# Patient Record
Sex: FEMALE | Race: WHITE | NOT HISPANIC OR LATINO | Employment: FULL TIME | URBAN - METROPOLITAN AREA
[De-identification: names, ages, dates, MRNs, and addresses within clinical notes are randomized per-mention and may not be internally consistent; named-entity substitution may affect disease eponyms.]

---

## 2023-09-06 PROBLEM — R10.9 ABDOMINAL PAIN: Status: ACTIVE | Noted: 2023-09-06

## 2023-09-06 PROBLEM — R06.02 SHORTNESS OF BREATH: Status: ACTIVE | Noted: 2023-09-06

## 2023-09-06 PROBLEM — R07.9 CHEST PAIN: Status: ACTIVE | Noted: 2023-09-06

## 2023-09-06 PROBLEM — R10.13 ABDOMINAL PAIN, ACUTE, EPIGASTRIC: Status: ACTIVE | Noted: 2023-09-06

## 2023-09-06 PROBLEM — R60.0 BILATERAL EDEMA OF LOWER EXTREMITY: Status: ACTIVE | Noted: 2023-09-06

## 2023-09-06 PROBLEM — E04.1 THYROID NODULE: Status: ACTIVE | Noted: 2023-09-06

## 2023-09-06 PROBLEM — I35.1 MODERATE AORTIC REGURGITATION: Status: ACTIVE | Noted: 2023-09-06

## 2023-09-06 PROBLEM — F32.0 MAJOR DEPRESSIVE DISORDER, SINGLE EPISODE, MILD (CMS-HCC): Status: ACTIVE | Noted: 2023-09-06

## 2023-09-06 PROBLEM — H91.90 SUBJECTIVE HEARING LOSS: Status: ACTIVE | Noted: 2023-09-06

## 2023-09-06 PROBLEM — K29.80 DUODENITIS: Status: ACTIVE | Noted: 2023-09-06

## 2023-09-06 PROBLEM — U07.1 COVID-19 VIRUS INFECTION: Status: ACTIVE | Noted: 2023-09-06

## 2023-09-06 PROBLEM — K82.4 GALLBLADDER POLYP: Status: ACTIVE | Noted: 2023-09-06

## 2023-09-06 PROBLEM — H93.231 HYPERACUSIS OF RIGHT EAR: Status: ACTIVE | Noted: 2023-09-06

## 2023-09-06 PROBLEM — S06.0XAA CONCUSSION: Status: ACTIVE | Noted: 2023-09-06

## 2023-09-06 PROBLEM — R05.9 COUGH: Status: ACTIVE | Noted: 2023-09-06

## 2023-09-06 PROBLEM — N28.9 KIDNEY LESION, NATIVE, LEFT: Status: ACTIVE | Noted: 2023-09-06

## 2023-09-06 PROBLEM — D13.5 ADENOMYOMATOSIS OF GALLBLADDER: Status: ACTIVE | Noted: 2023-09-06

## 2023-09-06 PROBLEM — H93.11 RIGHT-SIDED TINNITUS: Status: ACTIVE | Noted: 2023-09-06

## 2023-09-06 PROBLEM — K82.8 BILIARY DYSKINESIA: Status: ACTIVE | Noted: 2023-09-06

## 2023-09-06 PROBLEM — R00.2 PALPITATIONS: Status: ACTIVE | Noted: 2023-09-06

## 2023-09-06 PROBLEM — E04.9 ENLARGED THYROID: Status: ACTIVE | Noted: 2023-09-06

## 2023-09-06 PROBLEM — R10.11 ABDOMINAL PAIN, RUQ (RIGHT UPPER QUADRANT): Status: ACTIVE | Noted: 2023-09-06

## 2023-09-06 RX ORDER — LEVONORGESTREL 52 MG/1
INTRAUTERINE DEVICE INTRAUTERINE
COMMUNITY

## 2023-09-06 RX ORDER — ALBUTEROL SULFATE 90 UG/1
AEROSOL, METERED RESPIRATORY (INHALATION)
COMMUNITY
Start: 2021-05-07 | End: 2023-09-26 | Stop reason: ALTCHOICE

## 2023-09-26 ENCOUNTER — OFFICE VISIT (OUTPATIENT)
Dept: PRIMARY CARE | Facility: CLINIC | Age: 43
End: 2023-09-26
Payer: COMMERCIAL

## 2023-09-26 VITALS
HEART RATE: 90 BPM | BODY MASS INDEX: 27.94 KG/M2 | TEMPERATURE: 96.7 F | HEIGHT: 67 IN | SYSTOLIC BLOOD PRESSURE: 116 MMHG | DIASTOLIC BLOOD PRESSURE: 70 MMHG | WEIGHT: 178 LBS | OXYGEN SATURATION: 98 %

## 2023-09-26 DIAGNOSIS — M54.14 THORACIC RADICULITIS: Primary | ICD-10-CM

## 2023-09-26 DIAGNOSIS — I35.1 MODERATE AORTIC REGURGITATION: ICD-10-CM

## 2023-09-26 DIAGNOSIS — E04.9 ENLARGED THYROID: ICD-10-CM

## 2023-09-26 DIAGNOSIS — Z00.00 HEALTHCARE MAINTENANCE: ICD-10-CM

## 2023-09-26 DIAGNOSIS — E04.1 THYROID NODULE: ICD-10-CM

## 2023-09-26 DIAGNOSIS — R73.9 HYPERGLYCEMIA: ICD-10-CM

## 2023-09-26 DIAGNOSIS — Z12.31 BREAST CANCER SCREENING BY MAMMOGRAM: ICD-10-CM

## 2023-09-26 DIAGNOSIS — E55.9 VITAMIN D DEFICIENCY: ICD-10-CM

## 2023-09-26 PROBLEM — M71.371 OTHER BURSAL CYST, RIGHT ANKLE AND FOOT: Status: ACTIVE | Noted: 2017-04-19

## 2023-09-26 PROBLEM — D22.71: Status: ACTIVE | Noted: 2017-04-19

## 2023-09-26 PROBLEM — D17.23 LIPOMA OF RIGHT LOWER EXTREMITY: Status: ACTIVE | Noted: 2017-04-19

## 2023-09-26 PROBLEM — G89.29 CHRONIC PAIN OF RIGHT ANKLE: Status: ACTIVE | Noted: 2017-04-19

## 2023-09-26 PROBLEM — M25.571 CHRONIC PAIN OF RIGHT ANKLE: Status: ACTIVE | Noted: 2017-04-19

## 2023-09-26 PROCEDURE — 99396 PREV VISIT EST AGE 40-64: CPT | Performed by: INTERNAL MEDICINE

## 2023-09-26 PROCEDURE — 1036F TOBACCO NON-USER: CPT | Performed by: INTERNAL MEDICINE

## 2023-09-26 ASSESSMENT — ENCOUNTER SYMPTOMS
VOMITING: 0
JOINT SWELLING: 0
GASTROINTESTINAL NEGATIVE: 1
HEADACHES: 0
APPETITE CHANGE: 0
POLYPHAGIA: 0
POLYDIPSIA: 0
SPEECH DIFFICULTY: 0
HALLUCINATIONS: 0
WHEEZING: 0
NECK STIFFNESS: 0
SINUS PRESSURE: 0
DIFFICULTY URINATING: 0
COUGH: 0
CHEST TIGHTNESS: 0
ACTIVITY CHANGE: 0
PALPITATIONS: 0
NUMBNESS: 0
WOUND: 0
TREMORS: 0
COLOR CHANGE: 0
ABDOMINAL PAIN: 0
DIZZINESS: 0
EYES NEGATIVE: 1
SHORTNESS OF BREATH: 0
SORE THROAT: 0
CONSTITUTIONAL NEGATIVE: 1
EYE REDNESS: 0
RESPIRATORY NEGATIVE: 1
MYALGIAS: 0
NAUSEA: 0
FREQUENCY: 0
NERVOUS/ANXIOUS: 0
CONFUSION: 0
EYE PAIN: 0
FACIAL ASYMMETRY: 0
SEIZURES: 0
AGITATION: 0
CONSTIPATION: 0
NECK PAIN: 0
PSYCHIATRIC NEGATIVE: 1
ADENOPATHY: 0
STRIDOR: 0
VOICE CHANGE: 0
BACK PAIN: 0
BLOOD IN STOOL: 0
FLANK PAIN: 0
DIARRHEA: 0
SINUS PAIN: 0
EYE DISCHARGE: 0
UNEXPECTED WEIGHT CHANGE: 0
ARTHRALGIAS: 0
ALLERGIC/IMMUNOLOGIC NEGATIVE: 1
TROUBLE SWALLOWING: 0
BRUISES/BLEEDS EASILY: 0
SLEEP DISTURBANCE: 0
ENDOCRINE NEGATIVE: 1
DYSURIA: 0
WEAKNESS: 0
NEUROLOGICAL NEGATIVE: 1
HEMATOLOGIC/LYMPHATIC NEGATIVE: 1
LIGHT-HEADEDNESS: 0
MUSCULOSKELETAL NEGATIVE: 1

## 2023-09-26 ASSESSMENT — PATIENT HEALTH QUESTIONNAIRE - PHQ9
1. LITTLE INTEREST OR PLEASURE IN DOING THINGS: NOT AT ALL
2. FEELING DOWN, DEPRESSED OR HOPELESS: NOT AT ALL
SUM OF ALL RESPONSES TO PHQ9 QUESTIONS 1 AND 2: 0

## 2023-09-26 NOTE — PROGRESS NOTES
Subjective   Patient ID: Lisbeth Dunlap is a 42 y.o. female who presents for Annual Exam (She had an EKG with Dr. Navarro).  She is here for her annual physical, she did have EKG with Dr. Navarro this year     HPI    Patient has been feeling pretty good and has been complaint with prescribed medications.  C/o left sided chest discomfort on and off, worse with certain activities. Cardiac work up was negative.  Symptoms could be related to thoracic radiculopathy.  She has some concerns about occasional difficulties remembering words, w discussed possible referral to neurology, patient wants to wait and will let me know when ready.   We reviewed and discussed details of recent blood work: CBC, CMP, TSH, Lipid panel done in 2022.  Results within acceptable range. Borderline elevated glucose noted.     Last mammogram: 2021  PAP: per GYN        Review of Systems   Constitutional: Negative.  Negative for activity change, appetite change and unexpected weight change.   HENT: Negative.  Negative for congestion, ear discharge, ear pain, hearing loss, mouth sores, nosebleeds, sinus pressure, sinus pain, sore throat, trouble swallowing and voice change.    Eyes: Negative.  Negative for pain, discharge, redness and visual disturbance.   Respiratory: Negative.  Negative for cough, chest tightness, shortness of breath, wheezing and stridor.    Cardiovascular:  Positive for chest pain. Negative for palpitations and leg swelling.   Gastrointestinal: Negative.  Negative for abdominal pain, blood in stool, constipation, diarrhea, nausea and vomiting.   Endocrine: Negative.  Negative for polydipsia, polyphagia and polyuria.   Genitourinary: Negative.  Negative for difficulty urinating, dysuria, flank pain, frequency and urgency.   Musculoskeletal: Negative.  Negative for arthralgias, back pain, gait problem, joint swelling, myalgias, neck pain and neck stiffness.   Skin: Negative.  Negative for color change, rash and wound.  "  Allergic/Immunologic: Negative.  Negative for environmental allergies, food allergies and immunocompromised state.   Neurological: Negative.  Negative for dizziness, tremors, seizures, syncope, facial asymmetry, speech difficulty, weakness, light-headedness, numbness and headaches.   Hematological: Negative.  Negative for adenopathy. Does not bruise/bleed easily.   Psychiatric/Behavioral: Negative.  Negative for agitation, behavioral problems, confusion, hallucinations, sleep disturbance and suicidal ideas. The patient is not nervous/anxious.    All other systems reviewed and are negative.      Objective     Review of systems was performed and all systems were negative except what in HPI    /70 (BP Location: Left arm, Patient Position: Sitting)   Pulse 90   Temp 35.9 °C (96.7 °F) (Temporal)   Ht 1.702 m (5' 7\")   Wt 80.7 kg (178 lb)   SpO2 98%   BMI 27.88 kg/m²    Physical Exam  Vitals and nursing note reviewed.   Constitutional:       General: She is not in acute distress.     Appearance: Normal appearance.   HENT:      Head: Normocephalic and atraumatic.      Right Ear: External ear normal.      Left Ear: External ear normal.      Nose: Nose normal. No congestion or rhinorrhea.   Eyes:      General:         Right eye: No discharge.         Left eye: No discharge.      Extraocular Movements: Extraocular movements intact.      Conjunctiva/sclera: Conjunctivae normal.      Pupils: Pupils are equal, round, and reactive to light.   Neck:      Comments: Mildly enlarged thyroid gland noted.  Cardiovascular:      Rate and Rhythm: Normal rate and regular rhythm.      Pulses: Normal pulses.      Heart sounds: Murmur heard.      Systolic murmur is present with a grade of 2/6.      No friction rub. No gallop.   Pulmonary:      Effort: Pulmonary effort is normal. No respiratory distress.      Breath sounds: Normal breath sounds. No stridor. No wheezing, rhonchi or rales.   Chest:      Chest wall: No tenderness. "   Abdominal:      General: Bowel sounds are normal.      Palpations: Abdomen is soft. There is no mass.      Tenderness: There is no abdominal tenderness. There is no guarding or rebound.   Musculoskeletal:         General: No swelling or deformity. Normal range of motion.      Cervical back: Normal range of motion and neck supple. No rigidity or tenderness.      Right lower leg: No edema.      Left lower leg: No edema.   Lymphadenopathy:      Cervical: No cervical adenopathy.   Skin:     General: Skin is warm and dry.      Coloration: Skin is not jaundiced.      Findings: No bruising or erythema.   Neurological:      General: No focal deficit present.      Mental Status: She is alert and oriented to person, place, and time. Mental status is at baseline.      Cranial Nerves: No cranial nerve deficit.      Motor: No weakness.      Coordination: Coordination normal.      Gait: Gait normal.   Psychiatric:         Mood and Affect: Mood normal.         Behavior: Behavior normal.         Thought Content: Thought content normal.         Judgment: Judgment normal.         Assessment/Plan   Problem List Items Addressed This Visit             ICD-10-CM       Cardiac and Vasculature    Moderate aortic regurgitation I35.1     Clinically stable. F/up with cardiology(Dr. Aaron)            Endocrine/Metabolic    Enlarged thyroid E04.9    Relevant Orders    US thyroid    Thyroid nodule E04.1     S/p biopsy in 2019. Obtain US thyroid. Consult new endocrinologist.         Relevant Orders    Referral to Endocrinology     Other Visit Diagnoses         Codes    Thoracic radiculitis    -  Primary M54.14    Relevant Orders    XR thoracic spine complete 4+ views    Healthcare maintenance     Z00.00    Relevant Orders    CBC    Comprehensive Metabolic Panel    Lipid Panel    TSH with reflex to Free T4 if abnormal    Hyperglycemia     R73.9    Relevant Orders    Hemoglobin A1C    Vitamin D deficiency     E55.9    Relevant Orders    Vitamin  D 25-Hydroxy,Total (for eval of Vitamin D levels)    Breast cancer screening by mammogram     Z12.31    Relevant Orders    BI mammo bilateral screening tomosynthesis          It was a pleasure to see you today.  I would like to remind you about importance of a healthy lifestyle in order to improve your well-being and live longer.  Try to engage in physical activities for at least 150 minutes per week.  Eat about 10 servings of fruits and vegetables daily. My advice is 2 servings of fruits and 8 servings of vegetables.  For vegetables choose at least half of them green and at least half of them fresh.  Please avoid sugar, salt, fried food and saturated fat.    F/up in 1 year or sooner if needed.

## 2023-10-04 ENCOUNTER — APPOINTMENT (OUTPATIENT)
Dept: RADIOLOGY | Facility: CLINIC | Age: 43
End: 2023-10-04
Payer: COMMERCIAL

## 2023-10-07 ENCOUNTER — APPOINTMENT (OUTPATIENT)
Dept: RADIOLOGY | Facility: CLINIC | Age: 43
End: 2023-10-07
Payer: COMMERCIAL

## 2023-10-14 ENCOUNTER — LAB (OUTPATIENT)
Dept: LAB | Facility: LAB | Age: 43
End: 2023-10-14
Payer: COMMERCIAL

## 2023-10-14 DIAGNOSIS — E55.9 VITAMIN D DEFICIENCY: ICD-10-CM

## 2023-10-14 DIAGNOSIS — Z00.00 HEALTHCARE MAINTENANCE: ICD-10-CM

## 2023-10-14 DIAGNOSIS — R73.9 HYPERGLYCEMIA: ICD-10-CM

## 2023-10-14 LAB
25(OH)D3 SERPL-MCNC: 64 NG/ML (ref 30–100)
ALBUMIN SERPL BCP-MCNC: 4.5 G/DL (ref 3.4–5)
ALP SERPL-CCNC: 55 U/L (ref 33–110)
ALT SERPL W P-5'-P-CCNC: 14 U/L (ref 7–45)
ANION GAP SERPL CALC-SCNC: 9 MMOL/L (ref 10–20)
AST SERPL W P-5'-P-CCNC: 14 U/L (ref 9–39)
BILIRUB SERPL-MCNC: 0.6 MG/DL (ref 0–1.2)
BUN SERPL-MCNC: 12 MG/DL (ref 6–23)
CALCIUM SERPL-MCNC: 9.4 MG/DL (ref 8.6–10.3)
CHLORIDE SERPL-SCNC: 103 MMOL/L (ref 98–107)
CHOLEST SERPL-MCNC: 172 MG/DL (ref 0–199)
CHOLESTEROL/HDL RATIO: 3.2
CO2 SERPL-SCNC: 28 MMOL/L (ref 21–32)
CREAT SERPL-MCNC: 1.04 MG/DL (ref 0.5–1.05)
ERYTHROCYTE [DISTWIDTH] IN BLOOD BY AUTOMATED COUNT: 12.9 % (ref 11.5–14.5)
EST. AVERAGE GLUCOSE BLD GHB EST-MCNC: 103 MG/DL
GFR SERPL CREATININE-BSD FRML MDRD: 68 ML/MIN/1.73M*2
GLUCOSE SERPL-MCNC: 96 MG/DL (ref 74–99)
HBA1C MFR BLD: 5.2 %
HCT VFR BLD AUTO: 41 % (ref 36–46)
HDLC SERPL-MCNC: 54.5 MG/DL
HGB BLD-MCNC: 13.4 G/DL (ref 12–16)
LDLC SERPL CALC-MCNC: 105 MG/DL
MCH RBC QN AUTO: 30.7 PG (ref 26–34)
MCHC RBC AUTO-ENTMCNC: 32.7 G/DL (ref 32–36)
MCV RBC AUTO: 94 FL (ref 80–100)
NON HDL CHOLESTEROL: 118 MG/DL (ref 0–149)
NRBC BLD-RTO: 0 /100 WBCS (ref 0–0)
PLATELET # BLD AUTO: 272 X10*3/UL (ref 150–450)
PMV BLD AUTO: 11.1 FL (ref 7.5–11.5)
POTASSIUM SERPL-SCNC: 4.3 MMOL/L (ref 3.5–5.3)
PROT SERPL-MCNC: 6.9 G/DL (ref 6.4–8.2)
RBC # BLD AUTO: 4.36 X10*6/UL (ref 4–5.2)
SODIUM SERPL-SCNC: 136 MMOL/L (ref 136–145)
TRIGL SERPL-MCNC: 64 MG/DL (ref 0–149)
TSH SERPL-ACNC: 1.16 MIU/L (ref 0.44–3.98)
VLDL: 13 MG/DL (ref 0–40)
WBC # BLD AUTO: 6.5 X10*3/UL (ref 4.4–11.3)

## 2023-10-14 PROCEDURE — 83036 HEMOGLOBIN GLYCOSYLATED A1C: CPT

## 2023-10-14 PROCEDURE — 36415 COLL VENOUS BLD VENIPUNCTURE: CPT

## 2023-10-14 PROCEDURE — 85027 COMPLETE CBC AUTOMATED: CPT

## 2023-10-14 PROCEDURE — 80061 LIPID PANEL: CPT

## 2023-10-14 PROCEDURE — 82306 VITAMIN D 25 HYDROXY: CPT

## 2023-10-14 PROCEDURE — 80053 COMPREHEN METABOLIC PANEL: CPT

## 2023-10-14 PROCEDURE — 84443 ASSAY THYROID STIM HORMONE: CPT

## 2023-10-18 ENCOUNTER — APPOINTMENT (OUTPATIENT)
Dept: RADIOLOGY | Facility: CLINIC | Age: 43
End: 2023-10-18
Payer: COMMERCIAL

## 2023-10-19 ENCOUNTER — APPOINTMENT (OUTPATIENT)
Dept: RADIOLOGY | Facility: CLINIC | Age: 43
End: 2023-10-19
Payer: COMMERCIAL

## 2023-10-25 ENCOUNTER — ANCILLARY PROCEDURE (OUTPATIENT)
Dept: RADIOLOGY | Facility: CLINIC | Age: 43
End: 2023-10-25
Payer: COMMERCIAL

## 2023-10-25 DIAGNOSIS — E04.9 ENLARGED THYROID: ICD-10-CM

## 2023-10-25 DIAGNOSIS — Z12.31 BREAST CANCER SCREENING BY MAMMOGRAM: ICD-10-CM

## 2023-10-25 DIAGNOSIS — M54.14 THORACIC RADICULITIS: ICD-10-CM

## 2023-10-25 PROCEDURE — 77063 BREAST TOMOSYNTHESIS BI: CPT | Mod: BILATERAL PROCEDURE | Performed by: RADIOLOGY

## 2023-10-25 PROCEDURE — 76536 US EXAM OF HEAD AND NECK: CPT

## 2023-10-25 PROCEDURE — 77067 SCR MAMMO BI INCL CAD: CPT | Mod: BILATERAL PROCEDURE | Performed by: RADIOLOGY

## 2023-10-25 PROCEDURE — 72072 X-RAY EXAM THORAC SPINE 3VWS: CPT | Mod: FY

## 2023-10-25 PROCEDURE — 77067 SCR MAMMO BI INCL CAD: CPT | Mod: 50

## 2023-10-25 PROCEDURE — 76536 US EXAM OF HEAD AND NECK: CPT | Performed by: RADIOLOGY

## 2023-10-25 PROCEDURE — 72072 X-RAY EXAM THORAC SPINE 3VWS: CPT | Performed by: RADIOLOGY

## 2023-11-15 ENCOUNTER — APPOINTMENT (OUTPATIENT)
Dept: PRIMARY CARE | Facility: CLINIC | Age: 43
End: 2023-11-15
Payer: COMMERCIAL

## 2023-11-29 ENCOUNTER — OFFICE VISIT (OUTPATIENT)
Dept: DERMATOLOGY | Facility: CLINIC | Age: 43
End: 2023-11-29
Payer: COMMERCIAL

## 2023-11-29 DIAGNOSIS — Z12.83 SKIN CANCER SCREENING: Primary | ICD-10-CM

## 2023-11-29 DIAGNOSIS — L82.1 SEBORRHEIC KERATOSIS: ICD-10-CM

## 2023-11-29 DIAGNOSIS — D18.01 CHERRY ANGIOMA: ICD-10-CM

## 2023-11-29 DIAGNOSIS — L71.9 ROSACEA: ICD-10-CM

## 2023-11-29 DIAGNOSIS — D22.9 NEVUS: ICD-10-CM

## 2023-11-29 DIAGNOSIS — L81.4 LENTIGO: ICD-10-CM

## 2023-11-29 PROCEDURE — 1036F TOBACCO NON-USER: CPT | Performed by: NURSE PRACTITIONER

## 2023-11-29 PROCEDURE — 99204 OFFICE O/P NEW MOD 45 MIN: CPT | Performed by: NURSE PRACTITIONER

## 2023-11-29 RX ORDER — METRONIDAZOLE 7.5 MG/G
1 CREAM TOPICAL 2 TIMES DAILY
Qty: 45 G | Refills: 0 | Status: SHIPPED | OUTPATIENT
Start: 2023-11-29 | End: 2024-11-28

## 2023-11-29 RX ORDER — DOXYCYCLINE HYCLATE 100 MG
100 TABLET ORAL 2 TIMES DAILY
Qty: 120 TABLET | Refills: 0 | Status: SHIPPED | OUTPATIENT
Start: 2023-11-29 | End: 2024-01-28

## 2023-11-29 ASSESSMENT — ITCH NUMERIC RATING SCALE: HOW SEVERE IS YOUR ITCHING?: 0

## 2023-11-29 NOTE — PROGRESS NOTES
"Subjective     Lisbeth Dunlap is a 43 y.o. female who presents for the following: Rosacea and Skin Check.     New patient visit patient in for full body skin exam patient states she would also like to address rosacea she has had longstanding patient has tried \"some type of Metro gel \"but only uses this occasionally.  Patient states her rosacea is better today than when she first had made this appointment describes that she was flaring.    Review of Systems:  No other skin or systemic complaints other than what is documented elsewhere in the note.    The following portions of the chart were reviewed this encounter and updated as appropriate:       Skin Cancer History  No skin cancer on file.    Specialty Problems          Dermatology Problems    Nevus of toe of right foot     Past Medical History:  Lisbeth Dunlap  has a past medical history of Personal history of other diseases of the circulatory system (06/06/2019).    Past Surgical History:  Lisbeth Dunlap  has a past surgical history that includes Other surgical history (07/31/2019); Other surgical history (07/31/2019); and US guided thyroid biopsy (6/21/2019).    Family History:  Patient family history includes Breast cancer in her mother and another family member; CVA in her father; Hypertension in her father; Melanoma in her father.    Social History:  Lisbeth Dunlap  reports that she has never smoked. She has never used smokeless tobacco. She reports current alcohol use. She reports that she does not use drugs.    Allergies:  Bee venom protein (honey bee)    Current Medications / CAM's:    Current Outpatient Medications:     doxycycline (Vibra-Tabs) 100 mg tablet, Take 1 tablet (100 mg) by mouth 2 times a day. Take with a full glass of water and do not lie down for at least 30 minutes after., Disp: 120 tablet, Rfl: 0    levonorgestrel (Mirena) 21 mcg/24 hours (8 yrs) 52 mg IUD, by intrauterine route., Disp: , Rfl:     metroNIDAZOLE (Metrocream) 0.75 % " cream, Apply 1 Application topically 2 times a day., Disp: 45 g, Rfl: 0     Objective   Well appearing patient in no apparent distress; mood and affect are within normal limits.    A full examination was performed including scalp, head, eyes, ears, nose, lips, neck, chest, axillae, abdomen, back, buttocks, bilateral upper extremities, bilateral lower extremities, hands, feet, fingers, toes, fingernails, and toenails. All findings within normal limits unless otherwise noted below.    Assessment/Plan   1. Skin cancer screening    The patient presented for a routine skin examination today. There are no specific concerns regarding skin health and no new or changing moles, lesions, or rashes.     Assessment: Based on the comprehensive skin examination, there were no concerning or abnormal findings. The patient's skin appeared to be in good health, without any notable dermatologic conditions or lesions.    Plan: Given the absence of any significant skin findings, no specific interventions or treatments are warranted at this time. The patient was educated on the importance of regular skin self-examinations and advised to promptly report any changes or concerns. Routine follow-up for a skin examination was recommended.    -These lesions have benign, reassuring patterns on dermoscopy.  -There were no concerning features found on exam today.  -Recommend continued self-observation, and to contact the office if any changes in nevi are  noticed.    Discussed/information given on safe sun practices and use of sunscreen, sun protective clothing or sun avoidance. Recommend to use OTC medication of sunscreen SPF 30 or higher on a daily basis prior to sun exposure to reduce the risk of skin cancer.    Contact Office if: Any lesions change in size, shape or color; itch, bum or bleed.         2. Nevus  Multiple benign appearing flesh colored to pigmented macules and papules     Plan: Counseling.  I counseled the patient regarding the  "following:  Instructions: Monthly self-skin checks to monitor for any changes in moles are recommended. Expectations: Benign Nevi are pigmented nests of cells within the skin.No treatment is necessary. Contact Office if: Any moles change in size, shape or color; itch, bum or bleed.    3. Lentigo  Benign pigmented macules appearing in sun exposed areas     Solar lentigo (a type of lentigo also known as a senile lentigo, age spot, or liver spot) is a benign pigmented macule appearing on fair-skinned individuals that is related to ultraviolet radiation (UVR) exposure, typically from the sun.     PLAN:  Limiting sun exposure through avoidance, protective clothing, and use of sunscreens can help prevent the appearance of solar lentigines.    If lesion changes or becomes symptomatic she should return to clinic    4. Mcnamara angioma  Small (2-10 mm), bright red or violaceous macules or smooth, domed papules    PLAN:  Reassurance these lesions are benign  Treatment is only indicated in the case of irritation or hemorrhage    5. Seborrheic keratosis    Seborrheic keratoses (SKs) are extremely common benign neoplasms of the skin. There can be few or hundreds of these raised, \"stuck-on\"-appearing papules and plaques with well-defined borders. The cause is unknown, although there is a familial trait for the development of multiple SKs.      SKs tend to increase in incidence and number with increasing age.     Skin Care: Seborrheic Keratoses are benign. No treatment is necessary.    Patient was instructed to call the office if any lesions become irritated or inflamed        6. Rosacea  Dorsum of Nose, Left Alar Crease, Left Buccal Cheek, Left Malar Cheek, Left Nasal Sidewall, Left Parotid Area, Left Preauricular Area, Left Zygomatic Area, Right Buccal Cheek, Right Malar Cheek, Right Parotid Area  Mid face erythema with telangiectasias and scattered inflammatory papules.    Summary: You presented with symptoms consistent with " rosacea, including persistent redness, flushing, and the presence of papules and pustules on the central portion of your face. During our initial consultation, we discussed the possible triggers for your rosacea flare-ups, such as exposure to sunlight, hot and spicy foods, alcohol, and certain skincare products. We also addressed the importance of implementing a tailored skincare routine and adhering to prescribed treatments to manage your condition effectively.    PLAN:  1. Medication:  ° You were prescribed a topical antibiotic, such as metronidazole gel or azelaic acid, to help reduce inflammation and control the papules and pustules. It is crucial to apply the medication as directed and consistently to achieve optimal results.  ° In severe cases or if topical treatment proves inadequate, oral antibiotics like doxycycline or tetracycline may be considered for a short duration. We will monitor your progress closely and adjust the treatment accordingly.    2. Skincare Routine:  ° We discussed the significance of a gentle skincare routine tailored to your skin type. You should continue using mild, non-irritating cleansers and moisturizers suitable for sensitive skin. Avoid products that contain alcohol, fragrances, or other potential irritants.  ° Daily use of a broad-spectrum sunscreen with a high SPF (30 or above) remains crucial to protect your skin from UV radiation, which can trigger rosacea flare-ups. Reapply sunscreen every two hours, especially during prolonged sun exposure.     3. Lifestyle Modifications:  ° It is essential to identify and avoid triggers that exacerbate your rosacea symptoms. This may include limiting your intake of spicy foods, hot beverages, and alcohol. Keep a diary to track any potential triggers and share this information during future consultations.  ° Protecting your skin from extreme weather conditions, such as cold wind or excessive heat, is advised. Consider wearing a scarf or  using a gentle barrier cream during winter or when exposed to harsh environmental conditions.    4. Follow-Up Appointment:  ° Regular follow-up visits are crucial to monitor your progress, assess the effectiveness of the prescribed treatments, and make any necessary adjustments to your management plan.    Please remember that rosacea is a chronic condition, but with appropriate management and lifestyle modifications, its symptoms can be controlled effectively. If you experience any concerns or notice changes in your symptoms between appointments, please do not hesitate to contact our office.      Related Medications  doxycycline (Vibra-Tabs) 100 mg tablet  Take 1 tablet (100 mg) by mouth 2 times a day. Take with a full glass of water and do not lie down for at least 30 minutes after.    metroNIDAZOLE (Metrocream) 0.75 % cream  Apply 1 Application topically 2 times a day.

## 2024-02-28 ENCOUNTER — APPOINTMENT (OUTPATIENT)
Dept: DERMATOLOGY | Facility: CLINIC | Age: 44
End: 2024-02-28
Payer: COMMERCIAL

## 2024-09-27 ENCOUNTER — APPOINTMENT (OUTPATIENT)
Dept: PRIMARY CARE | Facility: CLINIC | Age: 44
End: 2024-09-27
Payer: COMMERCIAL

## 2024-09-27 VITALS
DIASTOLIC BLOOD PRESSURE: 60 MMHG | SYSTOLIC BLOOD PRESSURE: 110 MMHG | HEIGHT: 67 IN | RESPIRATION RATE: 16 BRPM | TEMPERATURE: 98 F | HEART RATE: 92 BPM | OXYGEN SATURATION: 98 % | WEIGHT: 189 LBS | BODY MASS INDEX: 29.66 KG/M2

## 2024-09-27 DIAGNOSIS — E55.9 VITAMIN D DEFICIENCY: ICD-10-CM

## 2024-09-27 DIAGNOSIS — Z00.00 HEALTHCARE MAINTENANCE: Primary | ICD-10-CM

## 2024-09-27 DIAGNOSIS — R40.0 DAYTIME SOMNOLENCE: ICD-10-CM

## 2024-09-27 DIAGNOSIS — Z12.11 COLON CANCER SCREENING: ICD-10-CM

## 2024-09-27 DIAGNOSIS — R29.818 SUSPECTED SLEEP APNEA: ICD-10-CM

## 2024-09-27 DIAGNOSIS — R53.83 OTHER FATIGUE: ICD-10-CM

## 2024-09-27 DIAGNOSIS — M54.2 NECK PAIN: ICD-10-CM

## 2024-09-27 DIAGNOSIS — J35.1 ENLARGED TONSILS: ICD-10-CM

## 2024-09-27 DIAGNOSIS — M54.50 MIDLINE LOW BACK PAIN, UNSPECIFIED CHRONICITY, UNSPECIFIED WHETHER SCIATICA PRESENT: ICD-10-CM

## 2024-09-27 DIAGNOSIS — Z12.31 BREAST CANCER SCREENING BY MAMMOGRAM: ICD-10-CM

## 2024-09-27 PROCEDURE — 3008F BODY MASS INDEX DOCD: CPT | Performed by: INTERNAL MEDICINE

## 2024-09-27 PROCEDURE — 99396 PREV VISIT EST AGE 40-64: CPT | Performed by: INTERNAL MEDICINE

## 2024-09-27 ASSESSMENT — PATIENT HEALTH QUESTIONNAIRE - PHQ9
2. FEELING DOWN, DEPRESSED OR HOPELESS: NOT AT ALL
1. LITTLE INTEREST OR PLEASURE IN DOING THINGS: NOT AT ALL
SUM OF ALL RESPONSES TO PHQ9 QUESTIONS 1 AND 2: 0

## 2024-09-27 NOTE — PROGRESS NOTES
Subjective   Patient ID: Lisbeth Dunlap is a 43 y.o. female who presents for Annual Exam (Pt is here due to annual physical, tired all the time .).  HPI    Patient has been feeling pretty good and has been complaint with prescribed medications.  C/o feeling tired during the day and this feeling has been interfering with her abilities to perform daily tasks..  She has been sleeping well, not gabriela if snoring.  Patient is aware that her tonsils have been enlarged for a long time.   Gained some weight over last several months.  Will evaluate for sleep apnea.      We reviewed and discussed details of recent blood work: CBC, CMP, TSH, Lipid panel, Hb A1c, Vit D done in 2023.  Results within acceptable range.    Last mammogram: 10/2023  PAP: per GYN Dr[facility] Ngo at [facility]  Colonoscopy: ordered    She has h/o Rheumatic fever and Aortic regurgitation, has been following with Dr. MEET Navarro.      Due to previous concerns about episodes of chest discomfort and SOB, palpitations she underwent Holter monitor and stress test in 2019. No significant arrhythmia noted, stress test was negative.     She was following  with endocrinologist Dr. Mortensen regarding thyroid nodules, new small nodule was identified in 2021.  Last US thyroid in 2023 showed 2 suspicious nodules.   Patient was  referred  to new endocrinologist, reminded to schedule appt.      Due to epigastric discomfort had EGD and US done in spring 2018, no significant abnormalities noted except gallbladder polyp. She was treated with PPI for few weeks without improvement.  Recent US of abdomen showed gallbladder polyp and adenomyomatosis of gallbladder wall. She has been having episodes of epigastric discomfort on and off. Subsequently saw surgeon Dr. Lyman, underwent HIDA scan which was negative, EGD showed gastroduodenitis. H. pylori negative.      CT scan of abdomen showed left kidney lesion, possible cyst.     Patient was concerned about fam h/o malignancies(breast,  melanoma). She saw genetic counselor, no genetic mutations found but she will be considered mildly increased risk based on family history.    She fell off water slide about 2 weeks ago, landed on buttocks has been c/o lower back pain (tail bone pain) since, which has been slowly improving, She has been also c/o left sided neck pain although she did not sustain any head or neck injury.                Review of Systems   Constitutional:  Positive for fatigue. Negative for activity change, appetite change and unexpected weight change.   HENT: Negative.  Negative for congestion, ear discharge, ear pain, hearing loss, mouth sores, nosebleeds, sinus pressure, sinus pain, sore throat, trouble swallowing and voice change.    Eyes: Negative.  Negative for pain, discharge, redness and visual disturbance.   Respiratory: Negative.  Negative for cough, chest tightness, shortness of breath, wheezing and stridor.    Cardiovascular: Negative.  Negative for chest pain, palpitations and leg swelling.   Gastrointestinal: Negative.  Negative for abdominal pain, blood in stool, constipation, diarrhea, nausea and vomiting.   Endocrine: Negative.  Negative for polydipsia, polyphagia and polyuria.   Genitourinary: Negative.  Negative for difficulty urinating, dysuria, flank pain, frequency and urgency.   Musculoskeletal:  Positive for back pain and neck pain. Negative for arthralgias, gait problem, joint swelling, myalgias and neck stiffness.   Skin: Negative.  Negative for color change, rash and wound.   Allergic/Immunologic: Negative.  Negative for environmental allergies, food allergies and immunocompromised state.   Neurological: Negative.  Negative for dizziness, tremors, seizures, syncope, facial asymmetry, speech difficulty, weakness, light-headedness, numbness and headaches.   Hematological: Negative.  Negative for adenopathy. Does not bruise/bleed easily.   Psychiatric/Behavioral: Negative.  Negative for agitation, behavioral  "problems, confusion, hallucinations, sleep disturbance and suicidal ideas. The patient is not nervous/anxious.    All other systems reviewed and are negative.      Objective     Review of systems was performed and all systems were negative except what in HPI    /60 (BP Location: Left arm, Patient Position: Sitting, BP Cuff Size: Adult)   Pulse 92   Temp 36.7 °C (98 °F) (Temporal)   Resp 16   Ht 1.702 m (5' 7\")   Wt 85.7 kg (189 lb)   SpO2 98%   BMI 29.60 kg/m²    Physical Exam  Vitals and nursing note reviewed.   Constitutional:       General: She is not in acute distress.     Appearance: Normal appearance.   HENT:      Head: Normocephalic and atraumatic.      Right Ear: Tympanic membrane, ear canal and external ear normal.      Left Ear: Tympanic membrane, ear canal and external ear normal.      Nose: Nose normal. No congestion or rhinorrhea.      Mouth/Throat:      Tonsils: 3+ on the right. 3+ on the left.   Eyes:      General:         Right eye: No discharge.         Left eye: No discharge.      Extraocular Movements: Extraocular movements intact.      Conjunctiva/sclera: Conjunctivae normal.      Pupils: Pupils are equal, round, and reactive to light.   Cardiovascular:      Rate and Rhythm: Normal rate and regular rhythm.      Pulses: Normal pulses.      Heart sounds: Normal heart sounds. No murmur heard.     No friction rub. No gallop.   Pulmonary:      Effort: Pulmonary effort is normal. No respiratory distress.      Breath sounds: Normal breath sounds. No stridor. No wheezing, rhonchi or rales.   Chest:      Chest wall: No tenderness.   Abdominal:      General: Bowel sounds are normal.      Palpations: Abdomen is soft. There is no mass.      Tenderness: There is no abdominal tenderness. There is no guarding or rebound.   Musculoskeletal:         General: No swelling or deformity. Normal range of motion.      Cervical back: Normal range of motion and neck supple. No rigidity or tenderness.      " Right lower leg: No edema.      Left lower leg: No edema.   Lymphadenopathy:      Cervical: No cervical adenopathy.   Skin:     General: Skin is warm and dry.      Coloration: Skin is not jaundiced.      Findings: No bruising or erythema.   Neurological:      General: No focal deficit present.      Mental Status: She is alert and oriented to person, place, and time. Mental status is at baseline.      Cranial Nerves: No cranial nerve deficit.      Motor: No weakness.      Coordination: Coordination normal.      Gait: Gait normal.   Psychiatric:         Mood and Affect: Mood normal.         Behavior: Behavior normal.         Thought Content: Thought content normal.         Judgment: Judgment normal.         Assessment/Plan   Problem List Items Addressed This Visit             ICD-10-CM       Health Encounters    Healthcare maintenance - Primary Z00.00    Relevant Orders    CBC    Comprehensive Metabolic Panel    Lipid Panel    TSH with reflex to Free T4 if abnormal     Other Visit Diagnoses         Codes    Colon cancer screening     Z12.11    Relevant Orders    Colonoscopy Screening; Average Risk Patient    Vitamin D deficiency     E55.9    Relevant Orders    Vitamin D 25-Hydroxy,Total (for eval of Vitamin D levels)    Breast cancer screening by mammogram     Z12.31    Relevant Orders    BI mammo bilateral screening tomosynthesis    Neck pain     M54.2    Relevant Orders    Referral to Physical Therapy    Suspected sleep apnea     R29.818    Relevant Orders    Home sleep apnea test (HSAT)    Daytime somnolence     R40.0    Relevant Orders    Home sleep apnea test (HSAT)    Enlarged tonsils     J35.1    Relevant Orders    Home sleep apnea test (HSAT)    Other fatigue     R53.83    Relevant Orders    Home sleep apnea test (HSAT)    Midline low back pain, unspecified chronicity, unspecified whether sciatica present     M54.50    Relevant Orders    Referral to Physical Therapy        It was a pleasure to see you  today.  I would like to remind you about importance of a healthy lifestyle in order to improve your well-being and live longer.  Try to engage in physical activities for at least 150 minutes per week.  Eat about 10 servings of fruits and vegetables daily. My advice is 2 servings of fruits and 8 servings of vegetables.  For vegetables choose at least half of them green and at least half of them fresh.  Please avoid sugar, salt, fried food and saturated fat.    F/up in 1 year or sooner if needed.

## 2024-09-28 ASSESSMENT — ENCOUNTER SYMPTOMS
NEUROLOGICAL NEGATIVE: 1
MYALGIAS: 0
SLEEP DISTURBANCE: 0
ARTHRALGIAS: 0
RESPIRATORY NEGATIVE: 1
CARDIOVASCULAR NEGATIVE: 1
BACK PAIN: 1
WHEEZING: 0
ALLERGIC/IMMUNOLOGIC NEGATIVE: 1
CONSTIPATION: 0
POLYDIPSIA: 0
WOUND: 0
DIARRHEA: 0
EYE DISCHARGE: 0
ABDOMINAL PAIN: 0
NECK PAIN: 1
SHORTNESS OF BREATH: 0
BLOOD IN STOOL: 0
UNEXPECTED WEIGHT CHANGE: 0
NAUSEA: 0
ENDOCRINE NEGATIVE: 1
JOINT SWELLING: 0
LIGHT-HEADEDNESS: 0
AGITATION: 0
TREMORS: 0
HEADACHES: 0
NERVOUS/ANXIOUS: 0
GASTROINTESTINAL NEGATIVE: 1
EYE PAIN: 0
COLOR CHANGE: 0
SPEECH DIFFICULTY: 0
SEIZURES: 0
FATIGUE: 1
DIZZINESS: 0
HALLUCINATIONS: 0
EYES NEGATIVE: 1
HEMATOLOGIC/LYMPHATIC NEGATIVE: 1
STRIDOR: 0
NUMBNESS: 0
TROUBLE SWALLOWING: 0
ADENOPATHY: 0
SORE THROAT: 0
NECK STIFFNESS: 0
APPETITE CHANGE: 0
EYE REDNESS: 0
SINUS PAIN: 0
VOICE CHANGE: 0
FLANK PAIN: 0
VOMITING: 0
BRUISES/BLEEDS EASILY: 0
SINUS PRESSURE: 0
WEAKNESS: 0
FREQUENCY: 0
PALPITATIONS: 0
COUGH: 0
PSYCHIATRIC NEGATIVE: 1
FACIAL ASYMMETRY: 0
CHEST TIGHTNESS: 0
CONFUSION: 0
ACTIVITY CHANGE: 0
DIFFICULTY URINATING: 0
POLYPHAGIA: 0
DYSURIA: 0

## 2024-10-14 ENCOUNTER — TELEPHONE (OUTPATIENT)
Dept: GASTROENTEROLOGY | Facility: EXTERNAL LOCATION | Age: 44
End: 2024-10-14
Payer: COMMERCIAL

## 2024-10-29 ENCOUNTER — HOSPITAL ENCOUNTER (OUTPATIENT)
Dept: RADIOLOGY | Facility: CLINIC | Age: 44
Discharge: HOME | End: 2024-10-29
Payer: COMMERCIAL

## 2024-10-29 ENCOUNTER — LAB (OUTPATIENT)
Dept: LAB | Facility: LAB | Age: 44
End: 2024-10-29
Payer: COMMERCIAL

## 2024-10-29 VITALS — WEIGHT: 180 LBS | HEIGHT: 67 IN | BODY MASS INDEX: 28.25 KG/M2

## 2024-10-29 DIAGNOSIS — E55.9 VITAMIN D DEFICIENCY: ICD-10-CM

## 2024-10-29 DIAGNOSIS — Z00.00 HEALTHCARE MAINTENANCE: ICD-10-CM

## 2024-10-29 DIAGNOSIS — Z12.31 BREAST CANCER SCREENING BY MAMMOGRAM: ICD-10-CM

## 2024-10-29 LAB
25(OH)D3 SERPL-MCNC: 75 NG/ML (ref 30–100)
ALBUMIN SERPL BCP-MCNC: 4.3 G/DL (ref 3.4–5)
ALP SERPL-CCNC: 55 U/L (ref 33–110)
ALT SERPL W P-5'-P-CCNC: 13 U/L (ref 7–45)
ANION GAP SERPL CALC-SCNC: 8 MMOL/L (ref 10–20)
AST SERPL W P-5'-P-CCNC: 12 U/L (ref 9–39)
BILIRUB SERPL-MCNC: 0.8 MG/DL (ref 0–1.2)
BUN SERPL-MCNC: 15 MG/DL (ref 6–23)
CALCIUM SERPL-MCNC: 9.2 MG/DL (ref 8.6–10.3)
CHLORIDE SERPL-SCNC: 104 MMOL/L (ref 98–107)
CHOLEST SERPL-MCNC: 188 MG/DL (ref 0–199)
CHOLESTEROL/HDL RATIO: 3.6
CO2 SERPL-SCNC: 29 MMOL/L (ref 21–32)
CREAT SERPL-MCNC: 1.07 MG/DL (ref 0.5–1.05)
EGFRCR SERPLBLD CKD-EPI 2021: 65 ML/MIN/1.73M*2
ERYTHROCYTE [DISTWIDTH] IN BLOOD BY AUTOMATED COUNT: 12.7 % (ref 11.5–14.5)
GLUCOSE SERPL-MCNC: 98 MG/DL (ref 74–99)
HCT VFR BLD AUTO: 39.1 % (ref 36–46)
HDLC SERPL-MCNC: 52.7 MG/DL
HGB BLD-MCNC: 13.1 G/DL (ref 12–16)
LDLC SERPL CALC-MCNC: 116 MG/DL
MCH RBC QN AUTO: 30.8 PG (ref 26–34)
MCHC RBC AUTO-ENTMCNC: 33.5 G/DL (ref 32–36)
MCV RBC AUTO: 92 FL (ref 80–100)
NON HDL CHOLESTEROL: 135 MG/DL (ref 0–149)
NRBC BLD-RTO: 0 /100 WBCS (ref 0–0)
PLATELET # BLD AUTO: 300 X10*3/UL (ref 150–450)
POTASSIUM SERPL-SCNC: 4.2 MMOL/L (ref 3.5–5.3)
PROT SERPL-MCNC: 6.8 G/DL (ref 6.4–8.2)
RBC # BLD AUTO: 4.25 X10*6/UL (ref 4–5.2)
SODIUM SERPL-SCNC: 137 MMOL/L (ref 136–145)
TRIGL SERPL-MCNC: 97 MG/DL (ref 0–149)
TSH SERPL-ACNC: 1.26 MIU/L (ref 0.44–3.98)
VLDL: 19 MG/DL (ref 0–40)
WBC # BLD AUTO: 6.5 X10*3/UL (ref 4.4–11.3)

## 2024-10-29 PROCEDURE — 77067 SCR MAMMO BI INCL CAD: CPT | Performed by: RADIOLOGY

## 2024-10-29 PROCEDURE — 82306 VITAMIN D 25 HYDROXY: CPT

## 2024-10-29 PROCEDURE — 77063 BREAST TOMOSYNTHESIS BI: CPT | Performed by: RADIOLOGY

## 2024-10-29 PROCEDURE — 84443 ASSAY THYROID STIM HORMONE: CPT

## 2024-10-29 PROCEDURE — 85027 COMPLETE CBC AUTOMATED: CPT

## 2024-10-29 PROCEDURE — 80061 LIPID PANEL: CPT

## 2024-10-29 PROCEDURE — 80053 COMPREHEN METABOLIC PANEL: CPT

## 2024-10-29 PROCEDURE — 77063 BREAST TOMOSYNTHESIS BI: CPT

## 2024-10-29 PROCEDURE — 36415 COLL VENOUS BLD VENIPUNCTURE: CPT

## 2024-10-31 ENCOUNTER — EVALUATION (OUTPATIENT)
Dept: PHYSICAL THERAPY | Facility: CLINIC | Age: 44
End: 2024-10-31
Payer: COMMERCIAL

## 2024-10-31 DIAGNOSIS — M54.2 NECK PAIN: ICD-10-CM

## 2024-10-31 PROCEDURE — 97161 PT EVAL LOW COMPLEX 20 MIN: CPT | Mod: GP | Performed by: PHYSICAL THERAPIST

## 2024-10-31 ASSESSMENT — PAIN - FUNCTIONAL ASSESSMENT: PAIN_FUNCTIONAL_ASSESSMENT: 0-10

## 2024-10-31 ASSESSMENT — PAIN DESCRIPTION - DESCRIPTORS: DESCRIPTORS: ACHING;SORE

## 2024-10-31 ASSESSMENT — PAIN SCALES - GENERAL: PAINLEVEL_OUTOF10: 2

## 2024-11-25 ENCOUNTER — APPOINTMENT (OUTPATIENT)
Dept: PHYSICAL THERAPY | Facility: CLINIC | Age: 44
End: 2024-11-25
Payer: COMMERCIAL

## 2025-01-06 ENCOUNTER — DOCUMENTATION (OUTPATIENT)
Dept: PHYSICAL THERAPY | Facility: CLINIC | Age: 45
End: 2025-01-06
Payer: COMMERCIAL

## 2025-01-06 NOTE — PROGRESS NOTES
Physical Therapy    Discharge Summary    Name: Lisbeth Dunlap  MRN: [id number]  : 1980  Date: 25    Discharge Summary: PT    Discharge Information: Date of discharge 25, Date of last visit 10/31/24, Date of evaluation 10/31/24, Number of attended visits 1, Referred by Dr. Jamila Guerrero, and Referred for Neck pain    Therapy Summary: At the time of the patient's evaluation in PT, signficiant improvement in symptoms was already found. HEP provided, and patient instructed to contact this clinic in the next 2-3 weeks if additional treatment required    Discharge Status: D/t the nature of the absent discharge, patient's current status is unknown at this time     Rehab Discharge Reason: At the end of her evaluation, pt instructed to contact this office in the next 2-3 weeks if additional treatment was required. As it has now been greater than that time period, PT recommends discharge of case

## 2025-02-20 ENCOUNTER — APPOINTMENT (OUTPATIENT)
Dept: ENDOCRINOLOGY | Facility: CLINIC | Age: 45
End: 2025-02-20
Payer: COMMERCIAL

## 2025-02-20 VITALS
HEART RATE: 79 BPM | HEIGHT: 67 IN | DIASTOLIC BLOOD PRESSURE: 83 MMHG | WEIGHT: 184 LBS | SYSTOLIC BLOOD PRESSURE: 129 MMHG | BODY MASS INDEX: 28.88 KG/M2 | TEMPERATURE: 97.7 F

## 2025-02-20 DIAGNOSIS — E04.1 THYROID NODULE: Primary | ICD-10-CM

## 2025-02-20 PROCEDURE — 3008F BODY MASS INDEX DOCD: CPT | Performed by: STUDENT IN AN ORGANIZED HEALTH CARE EDUCATION/TRAINING PROGRAM

## 2025-02-20 PROCEDURE — 99203 OFFICE O/P NEW LOW 30 MIN: CPT | Performed by: STUDENT IN AN ORGANIZED HEALTH CARE EDUCATION/TRAINING PROGRAM

## 2025-02-20 NOTE — PROGRESS NOTES
Subjective   Lisbeth Dunlap is a 44 y.o. female who I was asked to see in consultation for evaluation of thyroid nodules.    Initially dx in 2019 was following with Dr. Mortensen   Bx in 2019 of right thyroid nodule showed cyst content  Last US in 2023  NODULE #: 1      Location: Right inferior pole 13 x 12 x 7 mm, previously 12 x 11 x 7 mm  TI-RADS category  5; (>7 points) Highly suspicious.      NODULE #: 2. Not seen previously    Location: Left midpole  Size: 4 x 5 x 4 mm  TI-RADS category  4; (4-6 points) Moderately suspicious.  CERVICAL LYMPH NODES:  No enlarged or morphologically abnormal cervical nodes are seen. 30 x  7 x 5 mm right level 5 B node with small echogenic hilum    IMPRESSION:  13 mm TIRADS 5 (highly suspicious) nodule in the right thyroid lobe  without significant change from 06/14/2019. This has been biopsied in  the interval and showed benign    5 mm TIRADS 4 (moderately suspicious) nodule in the left lobe, not  seen previously. This does not meet criteria for follow-up, as below.    Energy: Tired. Takes her a while to get up and be motivated.   Sleep: Sleeps 7-8 hours at night. Sometimes wakes up  Been told she snores.   Was asked for a sleep apnea test  Weight: Came down a little but struggles in lower her weight  Lab Results   Component Value Date    TSH 1.26 10/29/2024      BM: No   Cold or heat intolerance: Chronic cold intolerance. 6 months went through a period she was extra warm  Palpitations or tremors: When overwhelmed feels palpitations. No tremors   Menses or HF or NS: Had IUD spotting on occasions. No hot flashes maybe some night sweats  Cramps: Yes  Compressive symptoms:  Neck feels bull  - Hoarseness: No  - SOB while lying flat: No  - Dysphagia: No  Family history of thyroid cancer: No  History of head or neck radiation: No    MVI  Vitamin D and B  IUD    Fhx:  Vitamin D deficiency    Review of Systems  all pertinent systems reviewed and are otherwise negative   Objective   Visit  "Vitals  /83 (BP Location: Left arm, Patient Position: Sitting, BP Cuff Size: Adult)   Pulse 79   Temp 36.5 °C (97.7 °F)   Ht 1.702 m (5' 7\")   Wt 83.5 kg (184 lb)   BMI 28.82 kg/m²   OB Status Implant   Smoking Status Never   BSA 1.99 m²      Physical Exam  Constitutional:       General: She is not in acute distress.     Appearance: Normal appearance.   HENT:      Head: Normocephalic and atraumatic.   Eyes:      Extraocular Movements: Extraocular movements intact.      Pupils: Pupils are equal, round, and reactive to light.   Neck:      Comments: 1 cm nodule felt on the left  Cardiovascular:      Rate and Rhythm: Normal rate and regular rhythm.   Pulmonary:      Effort: Pulmonary effort is normal. No respiratory distress.      Breath sounds: Normal breath sounds.   Abdominal:      General: Bowel sounds are normal.      Palpations: Abdomen is soft.      Tenderness: There is no abdominal tenderness.   Skin:     Coloration: Skin is not jaundiced or pale.      Findings: No erythema or rash.   Neurological:      General: No focal deficit present.      Mental Status: She is alert and oriented to person, place, and time.      Deep Tendon Reflexes: Reflexes normal.   Psychiatric:         Mood and Affect: Mood normal.         Behavior: Behavior normal.         Lab Review  Lab Results   Component Value Date    TSH 1.26 10/29/2024     No results found for: \"FREET4\"    Assessment/Plan   Lisbeth Dunlap is a 44 y.o. female who I was asked to see in consultation for evaluation of thyroid nodules.    Initially dx in 2019 was following with Dr. Mortensen   Bx in 2019 of right thyroid nodule showed cyst content  Last US in 2023  NODULE #: 1      Location: Right inferior pole 13 x 12 x 7 mm, previously 12 x 11 x 7 mm  TI-RADS category  5; (>7 points) Highly suspicious.      NODULE #: 2. Not seen previously    Location: Left midpole  Size: 4 x 5 x 4 mm  TI-RADS category  4; (4-6 points) Moderately suspicious.  CERVICAL LYMPH " NODES:  No enlarged or morphologically abnormal cervical nodes are seen. 30 x  7 x 5 mm right level 5 B node with small echogenic hilum    IMPRESSION:  13 mm TIRADS 5 (highly suspicious) nodule in the right thyroid lobe  without significant change from 06/14/2019. This has been biopsied in  the interval and showed benign    5 mm TIRADS 4 (moderately suspicious) nodule in the left lobe, not  seen previously. This does not meet criteria for follow-up, as below.  Was asked for a sleep apnea test  Last TSH OCT 2024 was 1.26  Menses or HF or NS: Had IUD spotting on occasions. No hot flashes maybe some night sweats    Plan:  US thyroid  Continue vitamin D    RTC in 1 year

## 2025-02-26 ENCOUNTER — HOSPITAL ENCOUNTER (OUTPATIENT)
Dept: RADIOLOGY | Facility: HOSPITAL | Age: 45
Discharge: HOME | End: 2025-02-26
Payer: COMMERCIAL

## 2025-02-26 DIAGNOSIS — E04.1 THYROID NODULE: ICD-10-CM

## 2025-02-26 PROCEDURE — 76536 US EXAM OF HEAD AND NECK: CPT

## 2025-09-30 ENCOUNTER — APPOINTMENT (OUTPATIENT)
Dept: PRIMARY CARE | Facility: CLINIC | Age: 45
End: 2025-09-30
Payer: COMMERCIAL

## 2026-02-19 ENCOUNTER — APPOINTMENT (OUTPATIENT)
Facility: CLINIC | Age: 46
End: 2026-02-19
Payer: COMMERCIAL